# Patient Record
Sex: FEMALE | Race: WHITE
[De-identification: names, ages, dates, MRNs, and addresses within clinical notes are randomized per-mention and may not be internally consistent; named-entity substitution may affect disease eponyms.]

---
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
## 2021-04-06 ENCOUNTER — HOSPITAL ENCOUNTER (EMERGENCY)
Dept: HOSPITAL 47 - EC | Age: 21
Discharge: HOME | End: 2021-04-06
Payer: COMMERCIAL

## 2021-04-06 VITALS
RESPIRATION RATE: 20 BRPM | HEART RATE: 103 BPM | TEMPERATURE: 97.9 F | SYSTOLIC BLOOD PRESSURE: 113 MMHG | DIASTOLIC BLOOD PRESSURE: 71 MMHG

## 2021-04-06 DIAGNOSIS — Z91.048: ICD-10-CM

## 2021-04-06 DIAGNOSIS — M25.521: Primary | ICD-10-CM
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
## 2021-04-06 DIAGNOSIS — Z87.891: ICD-10-CM

## 2021-04-06 DIAGNOSIS — Z91.040: ICD-10-CM

## 2021-04-06 DIAGNOSIS — M79.645: ICD-10-CM

## 2021-04-06 DIAGNOSIS — Z91.038: ICD-10-CM

## 2021-04-06 PROCEDURE — 99284 EMERGENCY DEPT VISIT MOD MDM: CPT
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
## 2021-04-06 NOTE — XR
EXAMINATION TYPE: XR hand complete LT

 

DATE OF EXAM: 4/6/2021

 

COMPARISON: 4/11/2007

 

HISTORY: Pain

 

TECHNIQUE: Three views are submitted.

 

FINDINGS:

There is a subtle deformity involving the volar plate middle phalanx fourth digit correlate with poin
t tenderness. Remaining osseous structures intact. Joint spaces preserved.

 

IMPRESSION:

1. Question a subtle deformity involving the volar plate middle phalanx fourth digit correlate with p
oint tenderness to exclude subtle hairline fracture.

## 2021-04-06 NOTE — XR
EXAMINATION TYPE: XR elbow complete RT

 

DATE OF EXAM: 4/6/2021

 

COMPARISON: NONE

 

HISTORY: Pain

 

FINDINGS: 

Three views of the elbow demonstrate no pathologic joint effusion.  The osseous structures are intact
.  There is no acute fracture or dislocation.  

 

 

IMPRESSION:

1. No acute fracture or dislocation.  If symptoms persist follow-up study in 7 to 10 days could be ob
tained.

## 2021-07-01 ENCOUNTER — HOSPITAL ENCOUNTER (OUTPATIENT)
Dept: HOSPITAL 47 - LABWHC1 | Age: 21
Discharge: HOME | End: 2021-07-01
Attending: INTERNAL MEDICINE
Payer: COMMERCIAL

## 2021-07-01 DIAGNOSIS — O24.919: Primary | ICD-10-CM

## 2021-07-01 DIAGNOSIS — D64.9: ICD-10-CM

## 2021-07-01 DIAGNOSIS — E78.5: ICD-10-CM

## 2021-07-01 DIAGNOSIS — O99.519: ICD-10-CM

## 2021-07-01 DIAGNOSIS — O99.019: ICD-10-CM

## 2021-07-01 DIAGNOSIS — N18.30: ICD-10-CM

## 2021-07-01 DIAGNOSIS — O99.280: ICD-10-CM

## 2021-07-01 DIAGNOSIS — J45.909: ICD-10-CM

## 2021-07-01 DIAGNOSIS — O26.839: ICD-10-CM

## 2021-07-01 DIAGNOSIS — E03.9: ICD-10-CM

## 2021-07-01 LAB
ALBUMIN SERPL-MCNC: 4.5 G/DL (ref 3.8–4.9)
ALBUMIN/GLOB SERPL: 1.96 G/DL (ref 1.6–3.17)
ALP SERPL-CCNC: 54 U/L (ref 41–126)
ALT SERPL-CCNC: 13 U/L (ref 8–44)
ANION GAP SERPL CALC-SCNC: 4.9 MMOL/L (ref 4–12)
AST SERPL-CCNC: 20 U/L (ref 13–35)
BASOPHILS # BLD AUTO: 0.04 X 10*3/UL (ref 0–0.1)
BASOPHILS NFR BLD AUTO: 1 %
BUN SERPL-SCNC: 7 MG/DL (ref 9–27)
BUN/CREAT SERPL: 11.67 RATIO (ref 12–20)
CALCIUM SPEC-MCNC: 9.1 MG/DL (ref 8.7–10.3)
CHLORIDE SERPL-SCNC: 109 MMOL/L (ref 96–109)
CHOLEST SERPL-MCNC: 131 MG/DL (ref 0–200)
CK SERPL-CCNC: 65 U/L (ref 26–186)
CO2 SERPL-SCNC: 23.1 MMOL/L (ref 21.6–31.8)
EOSINOPHIL # BLD AUTO: 0.11 X 10*3/UL (ref 0.04–0.35)
EOSINOPHIL NFR BLD AUTO: 2.6 %
ERYTHROCYTE [DISTWIDTH] IN BLOOD BY AUTOMATED COUNT: 4.48 X 10*6/UL (ref 4.1–5.2)
ERYTHROCYTE [DISTWIDTH] IN BLOOD: 13 % (ref 11.5–14.5)
FERRITIN SERPL-MCNC: 8.3 NG/ML (ref 10–291)
GLOBULIN SER CALC-MCNC: 2.3 G/DL (ref 1.6–3.3)
GLUCOSE SERPL-MCNC: 84 MG/DL (ref 70–110)
HBA1C MFR BLD: 4.9 % (ref 4–6)
HCG SERPL-MCNC: (no result) MIU/ML
HCT VFR BLD AUTO: 40.9 % (ref 37.2–46.3)
HDLC SERPL-MCNC: 51 MG/DL (ref 40–60)
HGB BLD-MCNC: 13 G/DL (ref 12–15)
IRON SERPL-MCNC: 149 UG/DL (ref 50–170)
LDLC SERPL CALC-MCNC: 68.8 MG/DL (ref 0–131)
LYMPHOCYTES # SPEC AUTO: 1.3 X 10*3/UL (ref 0.9–5)
LYMPHOCYTES NFR SPEC AUTO: 31.1 %
MCH RBC QN AUTO: 29 PG (ref 27–32)
MCHC RBC AUTO-ENTMCNC: 31.8 G/DL (ref 32–37)
MCV RBC AUTO: 91.3 FL (ref 80–97)
MONOCYTES # BLD AUTO: 0.39 X 10*3/UL (ref 0.2–1)
MONOCYTES NFR BLD AUTO: 9.3 %
NEUTROPHILS # BLD AUTO: 2.33 X 10*3/UL (ref 1.8–7.7)
NEUTROPHILS NFR BLD AUTO: 55.8 %
PH UR: 6.5 [PH] (ref 5–8)
PLATELET # BLD AUTO: 135 X 10*3/UL (ref 140–440)
POTASSIUM SERPL-SCNC: 4.3 MMOL/L (ref 3.5–5.5)
PROT SERPL-MCNC: 6.8 G/DL (ref 6.2–8.2)
SODIUM SERPL-SCNC: 137 MMOL/L (ref 135–145)
SP GR UR: 1 (ref 1–1.03)
SQUAMOUS UR QL AUTO: 5 /HPF (ref 0–4)
TIBC SERPL-MCNC: 376 UG/DL (ref 228–460)
TRIGL SERPL-MCNC: 56 MG/DL (ref 0–149)
UROBILINOGEN UR QL STRIP: <2 MG/DL (ref ?–2)
VLDLC SERPL CALC-MCNC: 11.2 MG/DL (ref 5–40)
WBC # BLD AUTO: 4.18 X 10*3/UL (ref 4.5–10)
WBC #/AREA URNS HPF: 1 /HPF (ref 0–5)

## 2021-07-01 PROCEDURE — 80053 COMPREHEN METABOLIC PANEL: CPT

## 2021-07-01 PROCEDURE — 84702 CHORIONIC GONADOTROPIN TEST: CPT

## 2021-07-01 PROCEDURE — 82550 ASSAY OF CK (CPK): CPT

## 2021-07-01 PROCEDURE — 83036 HEMOGLOBIN GLYCOSYLATED A1C: CPT

## 2021-07-01 PROCEDURE — 85025 COMPLETE CBC W/AUTO DIFF WBC: CPT

## 2021-07-01 PROCEDURE — 36415 COLL VENOUS BLD VENIPUNCTURE: CPT

## 2021-07-01 PROCEDURE — 81001 URINALYSIS AUTO W/SCOPE: CPT

## 2021-07-01 PROCEDURE — 80061 LIPID PANEL: CPT

## 2021-07-01 PROCEDURE — 84443 ASSAY THYROID STIM HORMONE: CPT

## 2021-07-01 PROCEDURE — 83550 IRON BINDING TEST: CPT

## 2021-07-01 PROCEDURE — 82728 ASSAY OF FERRITIN: CPT

## 2021-07-01 PROCEDURE — 86140 C-REACTIVE PROTEIN: CPT

## 2021-07-01 PROCEDURE — 82306 VITAMIN D 25 HYDROXY: CPT

## 2021-07-01 PROCEDURE — 83540 ASSAY OF IRON: CPT

## 2021-07-01 PROCEDURE — 85652 RBC SED RATE AUTOMATED: CPT

## 2021-10-01 ENCOUNTER — HOSPITAL ENCOUNTER (EMERGENCY)
Dept: HOSPITAL 47 - EC | Age: 21
Discharge: HOME | End: 2021-10-01
Payer: COMMERCIAL

## 2021-10-01 VITALS — RESPIRATION RATE: 18 BRPM | TEMPERATURE: 98 F

## 2021-10-01 VITALS — DIASTOLIC BLOOD PRESSURE: 69 MMHG | SYSTOLIC BLOOD PRESSURE: 116 MMHG

## 2021-10-01 VITALS — HEART RATE: 81 BPM

## 2021-10-01 DIAGNOSIS — F12.90: ICD-10-CM

## 2021-10-01 DIAGNOSIS — Z3A.19: ICD-10-CM

## 2021-10-01 DIAGNOSIS — O21.9: Primary | ICD-10-CM

## 2021-10-01 DIAGNOSIS — F31.9: ICD-10-CM

## 2021-10-01 DIAGNOSIS — Z20.822: ICD-10-CM

## 2021-10-01 DIAGNOSIS — O99.512: ICD-10-CM

## 2021-10-01 DIAGNOSIS — J06.9: ICD-10-CM

## 2021-10-01 DIAGNOSIS — Z91.040: ICD-10-CM

## 2021-10-01 DIAGNOSIS — Z87.891: ICD-10-CM

## 2021-10-01 LAB
ALBUMIN SERPL-MCNC: 4 G/DL (ref 3.5–5)
ALP SERPL-CCNC: 69 U/L (ref 38–126)
ALT SERPL-CCNC: 12 U/L (ref 4–34)
AMYLASE SERPL-CCNC: 64 U/L (ref 30–110)
ANION GAP SERPL CALC-SCNC: 8 MMOL/L
AST SERPL-CCNC: 21 U/L (ref 14–36)
BASOPHILS # BLD AUTO: 0 K/UL (ref 0–0.2)
BASOPHILS NFR BLD AUTO: 0 %
BUN SERPL-SCNC: 3 MG/DL (ref 7–17)
CALCIUM SPEC-MCNC: 9.4 MG/DL (ref 8.4–10.2)
CHLORIDE SERPL-SCNC: 105 MMOL/L (ref 98–107)
CO2 SERPL-SCNC: 23 MMOL/L (ref 22–30)
EOSINOPHIL # BLD AUTO: 0.1 K/UL (ref 0–0.7)
EOSINOPHIL NFR BLD AUTO: 1 %
ERYTHROCYTE [DISTWIDTH] IN BLOOD BY AUTOMATED COUNT: 4.38 M/UL (ref 3.8–5.4)
ERYTHROCYTE [DISTWIDTH] IN BLOOD: 13.5 % (ref 11.5–15.5)
GLUCOSE SERPL-MCNC: 84 MG/DL (ref 74–99)
HCT VFR BLD AUTO: 40.6 % (ref 34–46)
HGB BLD-MCNC: 13.5 GM/DL (ref 11.4–16)
KETONES UR QL STRIP.AUTO: (no result)
LIPASE SERPL-CCNC: 103 U/L (ref 23–300)
LYMPHOCYTES # SPEC AUTO: 0.7 K/UL (ref 1–4.8)
LYMPHOCYTES NFR SPEC AUTO: 8 %
MCH RBC QN AUTO: 30.7 PG (ref 25–35)
MCHC RBC AUTO-ENTMCNC: 33.2 G/DL (ref 31–37)
MCV RBC AUTO: 92.6 FL (ref 80–100)
MONOCYTES # BLD AUTO: 0.6 K/UL (ref 0–1)
MONOCYTES NFR BLD AUTO: 7 %
NEUTROPHILS # BLD AUTO: 7.1 K/UL (ref 1.3–7.7)
NEUTROPHILS NFR BLD AUTO: 83 %
PH UR: 7.5 [PH] (ref 5–8)
PLATELET # BLD AUTO: 119 K/UL (ref 150–450)
POTASSIUM SERPL-SCNC: 4.1 MMOL/L (ref 3.5–5.1)
PROT SERPL-MCNC: 7 G/DL (ref 6.3–8.2)
RBC UR QL: 2 /HPF (ref 0–5)
SODIUM SERPL-SCNC: 136 MMOL/L (ref 137–145)
SP GR UR: 1.02 (ref 1–1.03)
SQUAMOUS UR QL AUTO: 76 /HPF (ref 0–4)
UROBILINOGEN UR QL STRIP: <2 MG/DL (ref ?–2)
WBC # BLD AUTO: 8.6 K/UL (ref 4–11)
WBC # UR AUTO: 2 /HPF (ref 0–5)

## 2021-10-01 PROCEDURE — 36415 COLL VENOUS BLD VENIPUNCTURE: CPT

## 2021-10-01 PROCEDURE — 81001 URINALYSIS AUTO W/SCOPE: CPT

## 2021-10-01 PROCEDURE — 82150 ASSAY OF AMYLASE: CPT

## 2021-10-01 PROCEDURE — 96374 THER/PROPH/DIAG INJ IV PUSH: CPT

## 2021-10-01 PROCEDURE — 87081 CULTURE SCREEN ONLY: CPT

## 2021-10-01 PROCEDURE — 80053 COMPREHEN METABOLIC PANEL: CPT

## 2021-10-01 PROCEDURE — 99284 EMERGENCY DEPT VISIT MOD MDM: CPT

## 2021-10-01 PROCEDURE — 83690 ASSAY OF LIPASE: CPT

## 2021-10-01 PROCEDURE — 87635 SARS-COV-2 COVID-19 AMP PRB: CPT

## 2021-10-01 PROCEDURE — 87430 STREP A AG IA: CPT

## 2021-10-01 PROCEDURE — 96361 HYDRATE IV INFUSION ADD-ON: CPT

## 2021-10-01 PROCEDURE — 85025 COMPLETE CBC W/AUTO DIFF WBC: CPT

## 2021-10-01 NOTE — ED
General Adult HPI





- General


Chief complaint: Nausea/Vomiting/Diarrhea


Stated complaint: 19wks preg, fever, congestion


Time Seen by Provider: 10/01/21 10:35


Source: patient, RN notes reviewed, old records reviewed


Mode of arrival: ambulatory


Limitations: no limitations





- History of Present Illness


Initial comments: 





This is a 21-year-old female presents emergency department 19 weeks pregnant.  

Patient comes in stating yesterday she started having a sore throat and then had

some congestion.  Patient states that was followed by vomiting and nausea.  Pat

ient denies any abdominal pain.  Patient denies any vaginal bleeding or 

discharge.  Patient's any chest pain difficulty breathing shortness of breath 

per patient denies any fever chills.  Patient denies any back pain.  Patient 

denies any swelling to the legs or calf tenderness.





- Related Data


                                Home Medications











 Medication  Instructions  Recorded  Confirmed


 


Prenatal Vit-Iron-Folic Acid 1 cap PO DAILY 10/01/21 10/01/21





[Prenatal-U Capsule (formulary)]   











                                    Allergies











Allergy/AdvReac Type Severity Reaction Status Date / Time


 


spider venom Allergy Severe Swelling Verified 10/01/21 11:38


 


latex Allergy  Rash/Hives Verified 10/01/21 11:38


 


[facility] Allergy  Anaphylaxis Uncoded 10/01/21 11:38














Review of Systems


ROS Statement: 


Those systems with pertinent positive or pertinent negative responses have been 

documented in the HPI.





ROS Other: All systems not noted in ROS Statement are negative.





Past Medical History


Past Medical History: Pneumonia


Additional Past Medical History / Comment(s): ADHD, Bipolar, Pneumonia, Sleep 

disorder, small bladder, small kidneys, nose bleeds.


History of Any Multi-Drug Resistant Organisms: None Reported


Past Surgical History: No Surgical Hx Reported


Past Psychological History: ADD/ADHD, Bipolar


Smoking Status: Former smoker


Past Alcohol Use History: None Reported


Past Drug Use History: Marijuana





General Exam





- General Exam Comments


Initial Comments: 





GENERAL:


Patient is well-developed and well-nourished.  Patient is nontoxic and 

well-hydrated and is in mild distress.





ENT:


Neck is soft and supple.  No significant lymphadenopathy is noted.  Oropharynx 

is clear.  Moist mucous membranes.  Neck has full range of motion without 

eliciting any pain.  





EYES:


The sclera were anicteric and conjunctiva were pink and moist.  Extraocular 

movements were intact and pupils were equal round and reactive to light.  

Eyelids were unremarkable.





PULMONARY:


Unlabored respirations.  Good breath sounds bilaterally.  No audible rales 

rhonchi or wheezing was noted.





CARDIOVASCULAR:


There is a regular rate and rhythm without any murmurs gallops or rubs. 





ABDOMEN:


Soft and nontender with normal bowel sounds.  Patient has a gravid abdomen





SKIN:


Skin is clear with no lesions or rashes and otherwise unremarkable.





NEUROLOGIC:


Patient is alert and oriented x3.  Cranial nerves II through XII are grossly 

intact.  Motor and sensory are also intact.  Normal speech, volume and content. 

Symmetrical smile.  





MUSCULOSKELETAL:


Normal extremities with adequate strength and full range of motion.  No lower 

extremity swelling or edema.  No calf tenderness.





LYMPHATICS:


No significant lymphadenopathy is noted





PSYCHIATRIC:


Normal psychiatric evaluation.


Limitations: no limitations





Course


                                   Vital Signs











  10/01/21 10/01/21 10/01/21





  10:33 11:36 12:00


 


Temperature 98 F  98 F


 


Pulse Rate 76  86


 


Respiratory 18 18 18





Rate   


 


Blood Pressure 121/78  116/69


 


O2 Sat by Pulse 98  99





Oximetry   














Medical Decision Making





- Medical Decision Making





I will back into the room to reevaluate the patient she was feeling much better 

was not vomiting at this point time.  Patient was comfortable going home with 

discharge the patient with some Zofran and she will follow-up as needed.





- Lab Data


Result diagrams: 


                                 10/01/21 11:10





                                 10/01/21 11:10


                                   Lab Results











  10/01/21 10/01/21 10/01/21 Range/Units





  11:10 11:10 11:10 


 


WBC  8.6    (4.0-11.0)  k/uL


 


RBC  4.38    (3.80-5.40)  m/uL


 


Hgb  13.5    (11.4-16.0)  gm/dL


 


Hct  40.6    (34.0-46.0)  %


 


MCV  92.6    (80.0-100.0)  fL


 


MCH  30.7    (25.0-35.0)  pg


 


MCHC  33.2    (31.0-37.0)  g/dL


 


RDW  13.5    (11.5-15.5)  %


 


Plt Count  119 L    (150-450)  k/uL


 


MPV  9.8    


 


Neutrophils %  83    %


 


Lymphocytes %  8    %


 


Monocytes %  7    %


 


Eosinophils %  1    %


 


Basophils %  0    %


 


Neutrophils #  7.1    (1.3-7.7)  k/uL


 


Lymphocytes #  0.7 L    (1.0-4.8)  k/uL


 


Monocytes #  0.6    (0-1.0)  k/uL


 


Eosinophils #  0.1    (0-0.7)  k/uL


 


Basophils #  0.0    (0-0.2)  k/uL


 


Sodium    136 L  (137-145)  mmol/L


 


Potassium    4.1  (3.5-5.1)  mmol/L


 


Chloride    105  ()  mmol/L


 


Carbon Dioxide    23  (22-30)  mmol/L


 


Anion Gap    8  mmol/L


 


BUN    3 L  (7-17)  mg/dL


 


Creatinine    0.50 L  (0.52-1.04)  mg/dL


 


Est GFR (CKD-EPI)AfAm    >90  (>60 ml/min/1.73 sqM)  


 


Est GFR (CKD-EPI)NonAf    >90  (>60 ml/min/1.73 sqM)  


 


Glucose    84  (74-99)  mg/dL


 


Calcium    9.4  (8.4-10.2)  mg/dL


 


Total Bilirubin    0.4  (0.2-1.3)  mg/dL


 


AST    21  (14-36)  U/L


 


ALT    12  (4-34)  U/L


 


Alkaline Phosphatase    69  ()  U/L


 


Total Protein    7.0  (6.3-8.2)  g/dL


 


Albumin    4.0  (3.5-5.0)  g/dL


 


Amylase    64  ()  U/L


 


Lipase    103  ()  U/L


 


Urine Color   Yellow   


 


Urine Appearance   Cloudy H   (Clear)  


 


Urine pH   7.5   (5.0-8.0)  


 


Ur Specific Gravity   1.016   (1.001-1.035)  


 


Urine Protein   Trace H   (Negative)  


 


Urine Glucose (UA)   Negative   (Negative)  


 


Urine Ketones   2+ H   (Negative)  


 


Urine Blood   Negative   (Negative)  


 


Urine Nitrite   Negative   (Negative)  


 


Urine Bilirubin   Negative   (Negative)  


 


Urine Urobilinogen   <2.0   (<2.0)  mg/dL


 


Ur Leukocyte Esterase   Small H   (Negative)  


 


Urine RBC   2   (0-5)  /hpf


 


Urine WBC   2   (0-5)  /hpf


 


Ur Squamous Epith Cells   76 H   (0-4)  /hpf


 


Amorphous Sediment   Rare H   (None)  /hpf


 


Urine Mucus   Few H   (None)  /hpf


 


Coronavirus (PCR)     (Not Detectd)  


 


Group A Strep Rapid     (Negative)  














  10/01/21 10/01/21 Range/Units





  11:10 11:10 


 


WBC    (4.0-11.0)  k/uL


 


RBC    (3.80-5.40)  m/uL


 


Hgb    (11.4-16.0)  gm/dL


 


Hct    (34.0-46.0)  %


 


MCV    (80.0-100.0)  fL


 


MCH    (25.0-35.0)  pg


 


MCHC    (31.0-37.0)  g/dL


 


RDW    (11.5-15.5)  %


 


Plt Count    (150-450)  k/uL


 


MPV    


 


Neutrophils %    %


 


Lymphocytes %    %


 


Monocytes %    %


 


Eosinophils %    %


 


Basophils %    %


 


Neutrophils #    (1.3-7.7)  k/uL


 


Lymphocytes #    (1.0-4.8)  k/uL


 


Monocytes #    (0-1.0)  k/uL


 


Eosinophils #    (0-0.7)  k/uL


 


Basophils #    (0-0.2)  k/uL


 


Sodium    (137-145)  mmol/L


 


Potassium    (3.5-5.1)  mmol/L


 


Chloride    ()  mmol/L


 


Carbon Dioxide    (22-30)  mmol/L


 


Anion Gap    mmol/L


 


BUN    (7-17)  mg/dL


 


Creatinine    (0.52-1.04)  mg/dL


 


Est GFR (CKD-EPI)AfAm    (>60 ml/min/1.73 sqM)  


 


Est GFR (CKD-EPI)NonAf    (>60 ml/min/1.73 sqM)  


 


Glucose    (74-99)  mg/dL


 


Calcium    (8.4-10.2)  mg/dL


 


Total Bilirubin    (0.2-1.3)  mg/dL


 


AST    (14-36)  U/L


 


ALT    (4-34)  U/L


 


Alkaline Phosphatase    ()  U/L


 


Total Protein    (6.3-8.2)  g/dL


 


Albumin    (3.5-5.0)  g/dL


 


Amylase    ()  U/L


 


Lipase    ()  U/L


 


Urine Color    


 


Urine Appearance    (Clear)  


 


Urine pH    (5.0-8.0)  


 


Ur Specific Gravity    (1.001-1.035)  


 


Urine Protein    (Negative)  


 


Urine Glucose (UA)    (Negative)  


 


Urine Ketones    (Negative)  


 


Urine Blood    (Negative)  


 


Urine Nitrite    (Negative)  


 


Urine Bilirubin    (Negative)  


 


Urine Urobilinogen    (<2.0)  mg/dL


 


Ur Leukocyte Esterase    (Negative)  


 


Urine RBC    (0-5)  /hpf


 


Urine WBC    (0-5)  /hpf


 


Ur Squamous Epith Cells    (0-4)  /hpf


 


Amorphous Sediment    (None)  /hpf


 


Urine Mucus    (None)  /hpf


 


Coronavirus (PCR)   Not Detected  (Not Detectd)  


 


Group A Strep Rapid  Negative   (Negative)  














Disposition


Clinical Impression: 


 Acute vomiting, Upper respiratory infection





Disposition: HOME SELF-CARE


Instructions (If sedation given, give patient instructions):  Acute Nausea and 

Vomiting (ED), Upper Respiratory Infection (ED)


Is patient prescribed a controlled substance at d/c from ED?: No


Referrals: 


Viraj Sherman MD [Primary Care Provider] - 1-2 days


Time of Disposition: 13:04

## 2022-02-04 ENCOUNTER — HOSPITAL ENCOUNTER (OUTPATIENT)
Dept: HOSPITAL 47 - FBPOP | Age: 22
End: 2022-02-04
Attending: OBSTETRICS & GYNECOLOGY
Payer: COMMERCIAL

## 2022-02-04 DIAGNOSIS — Z91.038: ICD-10-CM

## 2022-02-04 DIAGNOSIS — F17.200: ICD-10-CM

## 2022-02-04 DIAGNOSIS — O99.333: ICD-10-CM

## 2022-02-04 DIAGNOSIS — O47.1: Primary | ICD-10-CM

## 2022-02-04 DIAGNOSIS — Z3A.37: ICD-10-CM

## 2022-02-04 DIAGNOSIS — Z91.09: ICD-10-CM

## 2022-02-04 DIAGNOSIS — Z91.040: ICD-10-CM

## 2022-02-04 PROCEDURE — 59025 FETAL NON-STRESS TEST: CPT

## 2022-02-04 PROCEDURE — 99213 OFFICE O/P EST LOW 20 MIN: CPT

## 2022-02-05 VITALS
TEMPERATURE: 98.4 F | HEART RATE: 75 BPM | RESPIRATION RATE: 16 BRPM | SYSTOLIC BLOOD PRESSURE: 129 MMHG | DIASTOLIC BLOOD PRESSURE: 83 MMHG

## 2022-02-05 NOTE — P.MSEPDOC
Presenting Problems





- Arrival Data


Date of Arrival on Unit: 22


Time of Arrival on Unit: 13:24


Mode of Transport: Wheelchair





- Complaint


OB-Reason for Admission/Chief Complaint: Possible Onset of Labor


Comment: frequent uterine irriatability noted with 3 actual contractions seen 

since.  admit. 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 

she stands on.  feet for job. states has had contractions since intercourse





Prenatal Medical History





- Pregnancy Information


: 1


Para: 0


Term: 0


: 0


Abortions: Spontaneous or Elective: 0


Number of Living Children: 0





- Gestational Age


Gestational Age by RITA (wks/days): 37 Weeks and 5 Days





Review of Systems





- Review of Systems


Constitutional: No problems


Breast: No problems


ENT: No problems


Cardiovascular: No problems


Respiratory: No problems


Gastrointestinal: No problems


Genitourinary: No problems


Musculoskeletal: No problems


Neurological: No problems


Skin: No problems





Vital Signs





- Temperature


Temperature: 98.4 F


Temperature Source: Oral





- Pulse


  ** Right


Pulse Rate: 75


Pulse Assessment Method: Automatic Cuff





- Respirations


Respiratory Rate: 16


Oxygen Delivery Method: Room Air


O2 Sat by Pulse Oximetry: 98





- Blood Pressure


  ** Right Arm


Blood Pressure: 129/83


Blood Pressure Mean: 98


Blood Pressure Source: Automatic Cuff





Medical Screen Scoring





- Cervical Exam


Dilation (cm): 1


Effacement (%): 50


Station: -3


Membranes: Intact





- Uterine Contractions


Frequency From (mins): 5


Frequency To (mins): 10


Duration From (seconds): 60


Duration To (seconds): 90


Intensity: Mild


Resting: Soft to palpation





- Fetal Assessment - Baby A


Baseline FHR: 125


Fetal Heart Rate - NICHD Category: Category I (Normal)


NST: Reactive





Physician Notification





- Physician Notified


Physician Notified Date: 22


Physician Notified Time: 15:30


Physician: Fritz Lang Order Received: Yes





- Notification Comment


Comment: discharge





Maternal Fetal Triage Index





- Maternal Fetal Triage Index


Presenting for scheduled procedure w/no complaint: No





- Stat/Priority 1


Stat Priority 1: No





- Urgent/Priority 2


Urgent Priority 2: No





- Prompt/Priority 3


Prompt Priority 3: No





- Non-Urgent/Priority 4


Non-Urgent Priority 4: Yes


Criteria Met for Priority 4: Dr Lang called with pts reason for visit. baby 

cat 1 wtih reactive NST. pts.  complaints of contractions since 5 am after 

intercourse. Went right to work after having.  sex where she stands to work. 

contractions mostly irriatable with some organized ctx.  activity that she has 

to breathe thru. cervix 1 cm 50%. Orders to recheck cervix 60-90.  min after 

first exam and may discharge if not changed. call if changed.  notifkyler we.  

needGBS result if stays





Disposition





- Disposition


OB Disposition: Discharge to home


Discharge Date: 22


Discharge Time: 15:45


I agree with the RN Medical Screening Exam: Yes


Case reviewed; plan agreed upon as documented in EMR&OBIX.: Yes


Diagnosis: FALSE LABOR AT OR AFTER 37 COMPLETED WEEKS OF GESTATION

## 2022-02-21 ENCOUNTER — HOSPITAL ENCOUNTER (INPATIENT)
Dept: HOSPITAL 47 - 4FBP | Age: 22
LOS: 2 days | Discharge: HOME | End: 2022-02-23
Attending: OBSTETRICS & GYNECOLOGY | Admitting: OBSTETRICS & GYNECOLOGY
Payer: COMMERCIAL

## 2022-02-21 DIAGNOSIS — Z3A.40: ICD-10-CM

## 2022-02-21 DIAGNOSIS — Z91.040: ICD-10-CM

## 2022-02-21 DIAGNOSIS — Z87.891: ICD-10-CM

## 2022-02-21 LAB
BASOPHILS # BLD AUTO: 0 K/UL (ref 0–0.2)
BASOPHILS NFR BLD AUTO: 0 %
EOSINOPHIL # BLD AUTO: 0.1 K/UL (ref 0–0.7)
EOSINOPHIL NFR BLD AUTO: 1 %
ERYTHROCYTE [DISTWIDTH] IN BLOOD BY AUTOMATED COUNT: 4.59 M/UL (ref 3.8–5.4)
ERYTHROCYTE [DISTWIDTH] IN BLOOD: 12.4 % (ref 11.5–15.5)
HCT VFR BLD AUTO: 42.2 % (ref 34–46)
HGB BLD-MCNC: 14.1 GM/DL (ref 11.4–16)
LYMPHOCYTES # SPEC AUTO: 1.2 K/UL (ref 1–4.8)
LYMPHOCYTES NFR SPEC AUTO: 15 %
MCH RBC QN AUTO: 30.8 PG (ref 25–35)
MCHC RBC AUTO-ENTMCNC: 33.5 G/DL (ref 31–37)
MCV RBC AUTO: 92 FL (ref 80–100)
MONOCYTES # BLD AUTO: 0.4 K/UL (ref 0–1)
MONOCYTES NFR BLD AUTO: 5 %
NEUTROPHILS # BLD AUTO: 5.9 K/UL (ref 1.3–7.7)
NEUTROPHILS NFR BLD AUTO: 77 %
PLATELET # BLD AUTO: 129 K/UL (ref 150–450)
WBC # BLD AUTO: 7.7 K/UL (ref 4–11)

## 2022-02-21 PROCEDURE — 80306 DRUG TEST PRSMV INSTRMNT: CPT

## 2022-02-21 PROCEDURE — 86850 RBC ANTIBODY SCREEN: CPT

## 2022-02-21 PROCEDURE — 86900 BLOOD TYPING SEROLOGIC ABO: CPT

## 2022-02-21 PROCEDURE — 90707 MMR VACCINE SC: CPT

## 2022-02-21 PROCEDURE — 85025 COMPLETE CBC W/AUTO DIFF WBC: CPT

## 2022-02-21 PROCEDURE — 88307 TISSUE EXAM BY PATHOLOGIST: CPT

## 2022-02-21 PROCEDURE — 86901 BLOOD TYPING SEROLOGIC RH(D): CPT

## 2022-02-21 RX ADMIN — ACETAMINOPHEN PRN MG: 325 TABLET, FILM COATED ORAL at 23:17

## 2022-02-21 RX ADMIN — AMPICILLIN SODIUM SCH MLS/HR: 1 INJECTION, POWDER, FOR SOLUTION INTRAMUSCULAR; INTRAVENOUS at 11:30

## 2022-02-21 RX ADMIN — POTASSIUM CHLORIDE SCH: 14.9 INJECTION, SOLUTION INTRAVENOUS at 17:31

## 2022-02-21 RX ADMIN — POTASSIUM CHLORIDE SCH MLS/HR: 14.9 INJECTION, SOLUTION INTRAVENOUS at 07:20

## 2022-02-21 RX ADMIN — ACETAMINOPHEN PRN MG: 325 TABLET, FILM COATED ORAL at 17:31

## 2022-02-21 RX ADMIN — DOCUSATE SODIUM AND SENNOSIDES SCH: 50; 8.6 TABLET ORAL at 21:46

## 2022-02-21 RX ADMIN — AMPICILLIN SODIUM SCH: 1 INJECTION, POWDER, FOR SOLUTION INTRAMUSCULAR; INTRAVENOUS at 17:32

## 2022-02-21 RX ADMIN — POTASSIUM CHLORIDE SCH MLS/HR: 14.9 INJECTION, SOLUTION INTRAVENOUS at 09:19

## 2022-02-21 RX ADMIN — POTASSIUM CHLORIDE SCH MLS/HR: 14.9 INJECTION, SOLUTION INTRAVENOUS at 12:06

## 2022-02-21 NOTE — P.OP
Date of Procedure: 22


Preoperative Diagnosis: 


Intrauterine pregnancy at term: Nonreassuring fetal heart tones: Remote from 

delivery


Postoperative Diagnosis: 


Same


Procedure(s) Performed: 


Primary low transverse  section


Anesthesia: epidural


Surgeon: Fritz Lang


Assistant #1: Hanane Hammer


Estimated Blood Loss (ml): 400


IV fluids (ml): 600


Urine output (ml): 200


Pathology: other (Placenta)


Condition: stable


Disposition: floor


Operative Findings: 


During the course of labor, Pitocin augmentation of labor had been attempted but

following the Pitocin the baby began having decelerations.  She had several deep

variables with late component and also had at least 2 decelerations into the 60s

lasting for 4-5 minutes with the patient just laying in bed.  Oxygen was tried 

and position change was affected but overall ultimately with repetitive 

decelerations like this a decision with her that remote from delivery and with 

her not really melchor that well due to the fact that Pitocin augmentation 

had to be stopped, a  section was effected.


Description of Procedure: 


Patient was taken to the operating suite where a epidural anesthetic was found 

be adequate.  She was prepped and draped in the normal sterile fashion and 

placed in the dorsal supine position with leftward tilt.  Initially a 

Pfannenstiel skin incision was made and this incision was then carried through 

to underlying layer of the fascia was second knife.  Fascia was then nicked in 

the midline and this opening was extended laterally with Parr scissors.  

Superior and inferior aspect of this incision were then grasped tented up and 

bluntly and sharply dissected off the rectus muscles.  Rectus muscles were then 

divided midline and blunt dissection to the peritoneum was performed.  This 

opening was then extended superiorly and inferiorly with good visualization of 

both bowel bladder.  Bladder blade was then placed in the bladder flap 

identified.  It was entered sharply with Metzenbaum scissors carried across face

the uterus and then bluntly dissected out of the operative field.  Knife was 

then used to incise uterus this opening was fully developed with hemostat and 

then extended bluntly.  Head was then atraumatically delivered and mouth nares 

were bulb suctioned.  Anterior posterior shoulders were then easily delivered 

followed by the remainder the baby.  Nursery personnel was present to assume 

care and the umbilical cord was clamped cut usual fashion.  Placenta was then 

delivered intact Pitocin was added to the IV.  Uterus was then exteriorized 

cleared of clots and debris and closed in 2 layers with 0 Vicryl suture.  Once 

excellent hemostasis was obtained blood and debris was suctioned posterior 

cul-de-sac and the uterus was reinserted into the abdomen.  Gutters were then 

cleared.  Reinspection the incision reveals hemostasis.  Hernia layer was then 

closed with 3-0 Vicryl.  Fascia was closed with 0 Vicryl suture.  One layer of 

3-0 Vicryl placed in the deep subcuticular to care tissues to reapproximate the 

skin and close dead space.  Skin was then closed with 3-0 Vicryl subcuticularly.

 Sponge, lap, needle counts were all correct 2.  Patient was then taken to the 

recovery room in stable and satisfactory condition.

## 2022-02-21 NOTE — P.HPOB
History of Present Illness


H&P Date: 22


Chief Complaint: Intrauterine pregnancy at term: Induction of labor





Patient is a 21-year-old  at 40 weeks gestation arise for induction of 

labor.  Her pregnancy course had been, complicated by polyhydramnios that was 

noted early at about 32 weeks with borderline polyhydramnios and by 36 week she 

actually was polyhydramnios.  Reports titers weren't.  Repeat ultrasound at 38 

weeks showed resolution of polyhydramnios with an SUSIE of 18.  She is scheduled 

for an induction of labor.  She is dilated to 1-2 cm 80% effaced -2 station.  

Artificial rupture membranes was performed and clear fluid is noted.  She plans 

to use epidural for analgesia.  All questions are answered for her at this time.

 A category 1 tracing is noted..  She is groupie strep positive.  Pertinent labs

include A+ blood type, Rh and it was negative, rubella is nonimmune, hepatitis 

be surface antigen and HIV and RPR all negative.  





Past Medical History


Past Medical History: Pneumonia


Additional Past Medical History / Comment(s): ADHD, Bipolar.


History of Any Multi-Drug Resistant Organisms: None Reported


Past Surgical History: No Surgical Hx Reported


Past Anesthesia/Blood Transfusion Reactions: No Reported Reaction


Past Psychological History: ADD/ADHD, Bipolar


Smoking Status: Former smoker


Past Alcohol Use History: None Reported


Past Drug Use History: Marijuana





- Past Family History


  ** Mother


Family Medical History: No Reported History





Medications and Allergies


                                Home Medications











 Medication  Instructions  Recorded  Confirmed  Type


 


Prenatal Vit-Iron-Folic Acid 1 cap PO DAILY 10/01/21 02/21/22 History





[Prenatal-U Capsule (formulary)]    








                                    Allergies











Allergy/AdvReac Type Severity Reaction Status Date / Time


 


spider venom Allergy Severe Swelling Verified 22 14:10


 


coconut Allergy  Rash/Hives Verified 22 06:58


 


latex Allergy  Rash/Hives Verified 22 06:58














Exam


Osteopathic Statement: *.  No significant issues noted on an osteopathic 

structural exam other than those noted in the History and Physical/Consult.


                                   Vital Signs











  Temp Pulse Resp BP Pulse Ox


 


 22 16:30  97.7 F  75  18  132/65  97


 


 22 16:15   81   135/77 


 


 22 16:00  98.5 F  89  18  129/77  98


 


 22 15:45  98.8 F  80  18  148/73  95


 


 22 15:30  97.8 F  98  18  137/72  96


 


 22 06:55  97.0 F L  86  16  126/83 








                                Intake and Output











 22





 06:59 14:59 22:59


 


Output Total   500


 


Balance   -500


 


Output:   


 


  Output, Quantitative   500





  Blood Loss   


 


Other:   


 


  Weight 84.822 kg  














- OBG Physical Exam


Breast: both: normal (no masses)


Abdomen: bowel sounds normal, no diffuse tenderness, no bruit present, no 

guarding noted, no hepatomegaly, no splenomegaly, no mass


Vulva: both: normal


Vagina: normal moisture, no discharge


Cervix: no lesion, no discharge


Uterus: normal size, normal contour


Adnexa: both: normal


Anus/Rectum: normal perianal skin, no rectal mass, no hemorrhoids, heme negative





Results


Result Diagrams: 


                                 22 07:19





                  Abnormal Lab Results - Last 24 Hours (Table)











  22 Range/Units





  07:19 07:20 


 


Plt Count  129 L   (150-450)  k/uL


 


U Marijuana (THC) Screen   Detected H  (NotDetected)

## 2022-02-22 LAB
BASOPHILS # BLD AUTO: 0 K/UL (ref 0–0.2)
BASOPHILS NFR BLD AUTO: 0 %
EOSINOPHIL # BLD AUTO: 0.1 K/UL (ref 0–0.7)
EOSINOPHIL NFR BLD AUTO: 1 %
ERYTHROCYTE [DISTWIDTH] IN BLOOD BY AUTOMATED COUNT: 3.98 M/UL (ref 3.8–5.4)
ERYTHROCYTE [DISTWIDTH] IN BLOOD: 12.4 % (ref 11.5–15.5)
HCT VFR BLD AUTO: 37.3 % (ref 34–46)
HGB BLD-MCNC: 12.5 GM/DL (ref 11.4–16)
LYMPHOCYTES # SPEC AUTO: 0.8 K/UL (ref 1–4.8)
LYMPHOCYTES NFR SPEC AUTO: 8 %
MCH RBC QN AUTO: 31.4 PG (ref 25–35)
MCHC RBC AUTO-ENTMCNC: 33.5 G/DL (ref 31–37)
MCV RBC AUTO: 93.7 FL (ref 80–100)
MONOCYTES # BLD AUTO: 0.4 K/UL (ref 0–1)
MONOCYTES NFR BLD AUTO: 4 %
NEUTROPHILS # BLD AUTO: 8.9 K/UL (ref 1.3–7.7)
NEUTROPHILS NFR BLD AUTO: 87 %
PLATELET # BLD AUTO: 109 K/UL (ref 150–450)
WBC # BLD AUTO: 10.2 K/UL (ref 4–11)

## 2022-02-22 RX ADMIN — ACETAMINOPHEN PRN MG: 325 TABLET, FILM COATED ORAL at 17:58

## 2022-02-22 RX ADMIN — IBUPROFEN PRN MG: 600 TABLET ORAL at 04:16

## 2022-02-22 RX ADMIN — IBUPROFEN PRN MG: 600 TABLET ORAL at 23:00

## 2022-02-22 RX ADMIN — DOCUSATE SODIUM AND SENNOSIDES SCH EACH: 50; 8.6 TABLET ORAL at 08:17

## 2022-02-22 RX ADMIN — IBUPROFEN PRN MG: 600 TABLET ORAL at 16:54

## 2022-02-22 RX ADMIN — POTASSIUM CHLORIDE SCH: 14.9 INJECTION, SOLUTION INTRAVENOUS at 06:56

## 2022-02-22 RX ADMIN — ACETAMINOPHEN PRN MG: 325 TABLET, FILM COATED ORAL at 08:16

## 2022-02-22 NOTE — P.PNOBGPC
Subjective





- Subjective


Principal diagnosis: Postop day 1


Interval history: 





Patient is doing very well this afternoon.  She is involuting, voiding and 

tolerating her diet.  She voices no complaints and her pain is well-controlled. 

We'll plan continue care for now.


Patient reports: Reports appetite normal, Reports voiding normally, Reports pain

well controlled, Reports ambulating normally


[place]: doing well





Objective





- Vital Signs


Latest vital signs: 


                                   Vital Signs











  Temp Pulse Resp BP BP Pulse Ox


 


 22 12:00  99.0 F  63  15  125/82  


 


 22 07:46  97.5 F L  62  14  129/80  


 


 22 04:00  97.5 F L  67  16   128/82  98


 


 22 23:34  97.8 F  73  16   129/80  98


 


 22 20:00  97.8 F  68  16   126/76  96


 


 22 17:30  97.1 F L  68  18   134/85  97


 


 22 17:00   66    137/81  97


 


 22 16:30  97.7 F  75  18   132/65  97


 


 22 16:15   81    135/77 


 


 22 16:00  98.5 F  89  18   129/77  98


 


 22 15:45  98.8 F  80  18   148/73  95


 


 22 15:30  97.8 F  98  18   137/72  96








                                Intake and Output











 22





 22:59 06:59 14:59


 


Intake Total 16.3  


 


Output Total 900 1500 


 


Balance -883.7 -1500 


 


Intake:   


 


  Intake, IV Titration 16.3  





  Amount   


 


    Oxytocin 30 Units/500 ml 16.3  





    Ns 30 unit In Saline 1   





    500ml.bag @ Per Protocol   





    IV .Q0M [facility] Rx#:[id number]   


 


Output:   


 


  Urine 400 1500 


 


    Uretheral (Bruno)  500 


 


  Output, Quantitative 500  





  Blood Loss   


 


Other:   


 


  # Voids  1 1


 


  # Bowel Movements   0














- Exam


Lungs: bilateral: normal


Chest: Normal S1, Normal S2


Extremities: Present: normal


Abdomen: Present: normal appearance, soft.  Absent: distention, tenderness


Incision: Present: normal, dry, intact


Uterus: Present: normal, firm





- Labs


Labs: 


                  Abnormal Lab Results - Last 24 Hours (Table)











  22 Range/Units





  06:50 


 


Plt Count  109 L  (150-450)  k/uL


 


Neutrophils #  8.9 H  (1.3-7.7)  k/uL


 


Lymphocytes #  0.8 L  (1.0-4.8)  k/uL

## 2022-02-22 NOTE — P.PN
Progress Note - Text


Progress Note Date: 22 (075)





Anesthesia


Postop day 1


Subjective: Status Post section with Duramorph.


 Patient seen and examined.  Doing well without complaint.  VAS 1 out of 10.  

Denies nausea vomiting or pruritus..  Afebrile.  Gross lower extremity strength 

intact.  Without apparent anesthetic complications.





Objective: Vital signs reviewed


Heart: Regular Rate


Lungs: Good chest excursion


Abdomen: Appears nondistended





Assessment: Status post  with Duramorph postop day 1


Plan: Continue current care with your medical management.  Anticipated change in

pain needs this morning as we approach  time yesterday.  Instructions 

given all questions answered.


This note was dictated using Dragon software.  Please be advised there is a 

potential for misspellings or errors in transcription.

## 2022-02-23 VITALS — TEMPERATURE: 98 F

## 2022-02-23 VITALS — DIASTOLIC BLOOD PRESSURE: 81 MMHG | SYSTOLIC BLOOD PRESSURE: 131 MMHG | RESPIRATION RATE: 17 BRPM | HEART RATE: 84 BPM

## 2022-02-23 RX ADMIN — ACETAMINOPHEN PRN MG: 325 TABLET, FILM COATED ORAL at 11:13

## 2022-02-23 RX ADMIN — DOCUSATE SODIUM AND SENNOSIDES SCH: 50; 8.6 TABLET ORAL at 01:30

## 2022-02-23 RX ADMIN — IBUPROFEN PRN MG: 600 TABLET ORAL at 05:27

## 2022-02-23 RX ADMIN — DOCUSATE SODIUM AND SENNOSIDES SCH EACH: 50; 8.6 TABLET ORAL at 11:15

## 2022-02-23 NOTE — P.DS
Providers


Date of admission: 


02/21/22 06:31





Expected date of discharge: 02/23/22


Attending physician: 


Fritz Lang





Primary care physician: 


Stated None





Hospital Course: 





Patient is doing very well postop day 2.  She is involuting, voiding and 

tolerating her diet.  She voices no complaint.  Vital signs are stable and 

afebrile.  Heart regular, lungs clear, extremities without pain.  Abdomen is 

soft and bowel sounds are noted.  Her incision is clean dry and intact.  Uterus 

is firm below the umbilicus and lochia is reported to be light.  Assessment 

postop day 2.  Plan discharged home follow up with Dr. Bryan in 1 week.  

Discharge instructions are otherwise thoroughly reviewed and all questions were 

answered for her prior to her discharge.  Prescription for Motrin and [place] 

forceps the pharmacy and she is stable for discharge this time.


Patient Condition at Discharge: Good





Plan - Discharge Summary


New Discharge Prescriptions: 


New


   HYDROcodone/APAP 5-325MG [Norco 5-325] 1 tab PO Q4HR PRN #20 tab


     PRN Reason: Pain


   Ibuprofen [Motrin] 600 mg PO Q6HR PRN #30 tab


     PRN Reason: Pain





No Action


   Prenatal Vit-Iron-Folic Acid [Prenatal-U Capsule (formulary)] 1 cap PO DAILY


Discharge Medication List





Prenatal Vit-Iron-Folic Acid [Prenatal-U Capsule (formulary)] 1 cap PO DAILY 

10/01/21 [History]


HYDROcodone/APAP 5-325MG [Norco 5-325] 1 tab PO Q4HR PRN #20 tab 02/23/22 [Rx]


Ibuprofen [Motrin] 600 mg PO Q6HR PRN #30 tab 02/23/22 [Rx]








Follow up Appointment(s)/Referral(s): 


Karmen Bryan DO [Doctor of Osteopathic Medicine] - 1 Week


Activity/Diet/Wound Care/Special Instructions: 


Heavy lifting, limit stairs and driving, and pelvic rest.  If any high 

temperatures, heavy bleeding, or severe pain call my office.  No tub baths for 2

weeks but showering is fine.


Discharge Disposition: HOME SELF-CARE

## 2022-11-14 ENCOUNTER — HOSPITAL ENCOUNTER (EMERGENCY)
Dept: HOSPITAL 47 - EC | Age: 22
Discharge: HOME | End: 2022-11-14
Payer: COMMERCIAL

## 2022-11-14 VITALS — RESPIRATION RATE: 18 BRPM | SYSTOLIC BLOOD PRESSURE: 114 MMHG | DIASTOLIC BLOOD PRESSURE: 74 MMHG | HEART RATE: 102 BPM

## 2022-11-14 VITALS — TEMPERATURE: 98 F

## 2022-11-14 DIAGNOSIS — Z91.038: ICD-10-CM

## 2022-11-14 DIAGNOSIS — S20.212A: ICD-10-CM

## 2022-11-14 DIAGNOSIS — F31.9: ICD-10-CM
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
## 2022-11-14 DIAGNOSIS — S90.02XA: Primary | ICD-10-CM

## 2022-11-14 DIAGNOSIS — Z91.040: ICD-10-CM

## 2022-11-14 DIAGNOSIS — F12.90: ICD-10-CM

## 2022-11-14 DIAGNOSIS — Z79.899: ICD-10-CM

## 2022-11-14 DIAGNOSIS — F17.290: ICD-10-CM

## 2022-11-14 DIAGNOSIS — Z91.018: ICD-10-CM

## 2022-11-14 PROCEDURE — 99284 EMERGENCY DEPT VISIT MOD MDM: CPT
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
## 2024-09-17 ENCOUNTER — HOSPITAL ENCOUNTER (OUTPATIENT)
Dept: HOSPITAL 47 - RADXRMAIN | Age: 24
Discharge: HOME | End: 2024-09-17
Attending: INTERNAL MEDICINE
Payer: COMMERCIAL

## 2024-09-17 DIAGNOSIS — M43.6: Primary | ICD-10-CM

## 2024-09-17 PROCEDURE — 82550 ASSAY OF CK (CPK): CPT

## 2024-09-17 PROCEDURE — 72050 X-RAY EXAM NECK SPINE 4/5VWS: CPT

## 2024-09-17 PROCEDURE — 80074 ACUTE HEPATITIS PANEL: CPT

## 2024-09-17 PROCEDURE — 85652 RBC SED RATE AUTOMATED: CPT

## 2024-09-17 PROCEDURE — 83735 ASSAY OF MAGNESIUM: CPT

## 2024-09-17 PROCEDURE — 81001 URINALYSIS AUTO W/SCOPE: CPT

## 2024-09-17 PROCEDURE — 84550 ASSAY OF BLOOD/URIC ACID: CPT

## 2024-09-17 PROCEDURE — 87086 URINE CULTURE/COLONY COUNT: CPT

## 2024-09-17 PROCEDURE — 82306 VITAMIN D 25 HYDROXY: CPT

## 2024-09-17 PROCEDURE — 85025 COMPLETE CBC W/AUTO DIFF WBC: CPT

## 2024-09-17 PROCEDURE — 80053 COMPREHEN METABOLIC PANEL: CPT

## 2024-09-17 PROCEDURE — 86038 ANTINUCLEAR ANTIBODIES: CPT

## 2024-09-17 PROCEDURE — 72072 X-RAY EXAM THORAC SPINE 3VWS: CPT

## 2024-09-17 PROCEDURE — 86225 DNA ANTIBODY NATIVE: CPT
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
## 2024-09-18 LAB
ALBUMIN SERPL-MCNC: 4.4 G/DL (ref 3.8–4.9)
ALBUMIN/GLOB SERPL: 1.91 RATIO (ref 1.6–3.17)
ALP SERPL-CCNC: 49 U/L (ref 41–126)
ALT SERPL-CCNC: 18 U/L (ref 8–44)
ANION GAP SERPL CALC-SCNC: 10.3 MMOL/L (ref 4–12)
AST SERPL-CCNC: 19 U/L (ref 13–35)
BACTERIA UR QL AUTO: (no result)
BASOPHILS # BLD AUTO: 0.06 X 10*3/UL (ref 0–0.1)
BASOPHILS NFR BLD AUTO: 1.1 %
BUN SERPL-SCNC: 10.6 MG/DL (ref 9–27)
BUN/CREAT SERPL: 13.25 RATIO (ref 12–20)
CALCIUM SPEC-MCNC: 9.5 MG/DL (ref 8.7–10.3)
CHLORIDE SERPL-SCNC: 107 MMOL/L (ref 96–109)
CK SERPL-CCNC: 56 U/L (ref 26–186)
CO2 SERPL-SCNC: 23.7 MMOL/L (ref 21.6–31.8)
DSDNA AB TITR SER: <1 IU/ML
EOSINOPHIL # BLD AUTO: 0.18 X 10*3/UL (ref 0.04–0.35)
EOSINOPHIL NFR BLD AUTO: 3.2 %
ERYTHROCYTE [DISTWIDTH] IN BLOOD BY AUTOMATED COUNT: 4.48 X 10*6/UL (ref 4.1–5.2)
ERYTHROCYTE [DISTWIDTH] IN BLOOD: 12.2 % (ref 11.5–14.5)
GLOBULIN SER CALC-MCNC: 2.3 G/DL (ref 1.6–3.3)
GLUCOSE SERPL-MCNC: 81 MG/DL (ref 70–110)
HCT VFR BLD AUTO: 40.9 % (ref 37.2–46.3)
HGB BLD-MCNC: 13.3 G/DL (ref 12–15)
IMM GRANULOCYTES BLD QL AUTO: 0.2 %
LYMPHOCYTES # SPEC AUTO: 1.98 X 10*3/UL (ref 0.9–5)
LYMPHOCYTES NFR SPEC AUTO: 35.6 %
MAGNESIUM SPEC-SCNC: 1.8 MG/DL (ref 1.5–2.4)
MCH RBC QN AUTO: 29.7 PG (ref 27–32)
MCHC RBC AUTO-ENTMCNC: 32.5 G/DL (ref 32–37)
MCV RBC AUTO: 91.3 FL (ref 80–97)
MONOCYTES # BLD AUTO: 0.47 X 10*3/UL (ref 0.2–1)
MONOCYTES NFR BLD AUTO: 8.5 %
NEUTROPHILS # BLD AUTO: 2.86 X 10*3/UL (ref 1.8–7.7)
NEUTROPHILS NFR BLD AUTO: 51.4 %
NRBC BLD AUTO-RTO: 0 X 10*3/UL (ref 0–0.01)
PH UR: 5.5 [PH]
PLATELET # BLD AUTO: 182 X 10*3/UL (ref 140–440)
POTASSIUM SERPL-SCNC: 3.9 MMOL/L (ref 3.5–5.5)
PROT SERPL-MCNC: 6.7 G/DL (ref 6.2–8.2)
SODIUM SERPL-SCNC: 141 MMOL/L (ref 135–145)
SP GR UR: 1.03 (ref 1–1.03)
URATE SERPL-MCNC: 3.5 MG/DL (ref 2.9–7.7)
UROBILINOGEN UR QL: 0.2
WBC # BLD AUTO: 5.56 X 10*3/UL (ref 4.5–10)

## 2025-05-06 ENCOUNTER — HOSPITAL ENCOUNTER (OUTPATIENT)
Dept: HOSPITAL 47 - RADECHMAIN | Age: 25
Discharge: HOME | End: 2025-05-06
Attending: INTERNAL MEDICINE
Payer: COMMERCIAL

## 2025-05-06 DIAGNOSIS — I08.1: Primary | ICD-10-CM

## 2025-05-06 PROCEDURE — 93306 TTE W/DOPPLER COMPLETE: CPT
